# Patient Record
Sex: FEMALE | Race: WHITE | NOT HISPANIC OR LATINO | URBAN - METROPOLITAN AREA
[De-identification: names, ages, dates, MRNs, and addresses within clinical notes are randomized per-mention and may not be internally consistent; named-entity substitution may affect disease eponyms.]

---

## 2017-10-25 ENCOUNTER — OUTPATIENT (OUTPATIENT)
Dept: OUTPATIENT SERVICES | Facility: HOSPITAL | Age: 72
LOS: 1 days | Discharge: HOME | End: 2017-10-25

## 2017-10-25 DIAGNOSIS — Z12.31 ENCOUNTER FOR SCREENING MAMMOGRAM FOR MALIGNANT NEOPLASM OF BREAST: ICD-10-CM

## 2018-10-26 ENCOUNTER — OUTPATIENT (OUTPATIENT)
Dept: OUTPATIENT SERVICES | Facility: HOSPITAL | Age: 73
LOS: 1 days | Discharge: HOME | End: 2018-10-26

## 2018-10-26 DIAGNOSIS — Z12.31 ENCOUNTER FOR SCREENING MAMMOGRAM FOR MALIGNANT NEOPLASM OF BREAST: ICD-10-CM

## 2018-10-29 DIAGNOSIS — M81.0 AGE-RELATED OSTEOPOROSIS WITHOUT CURRENT PATHOLOGICAL FRACTURE: ICD-10-CM

## 2018-10-29 DIAGNOSIS — Z78.0 ASYMPTOMATIC MENOPAUSAL STATE: ICD-10-CM

## 2018-10-29 DIAGNOSIS — Z13.820 ENCOUNTER FOR SCREENING FOR OSTEOPOROSIS: ICD-10-CM

## 2019-10-23 PROBLEM — Z00.00 ENCOUNTER FOR PREVENTIVE HEALTH EXAMINATION: Status: ACTIVE | Noted: 2019-10-23

## 2019-10-24 ENCOUNTER — OTHER (OUTPATIENT)
Age: 74
End: 2019-10-24

## 2019-11-14 ENCOUNTER — OUTPATIENT (OUTPATIENT)
Dept: OUTPATIENT SERVICES | Facility: HOSPITAL | Age: 74
LOS: 1 days | Discharge: HOME | End: 2019-11-14
Payer: MEDICARE

## 2019-11-14 DIAGNOSIS — Z12.31 ENCOUNTER FOR SCREENING MAMMOGRAM FOR MALIGNANT NEOPLASM OF BREAST: ICD-10-CM

## 2019-11-14 PROCEDURE — 77067 SCR MAMMO BI INCL CAD: CPT | Mod: 26

## 2019-11-14 PROCEDURE — 77063 BREAST TOMOSYNTHESIS BI: CPT | Mod: 26

## 2019-11-18 ENCOUNTER — OTHER (OUTPATIENT)
Age: 74
End: 2019-11-18

## 2019-11-18 DIAGNOSIS — R92.8 OTHER ABNORMAL AND INCONCLUSIVE FINDINGS ON DIAGNOSTIC IMAGING OF BREAST: ICD-10-CM

## 2019-11-19 ENCOUNTER — OUTPATIENT (OUTPATIENT)
Dept: OUTPATIENT SERVICES | Facility: HOSPITAL | Age: 74
LOS: 1 days | Discharge: HOME | End: 2019-11-19
Payer: MEDICARE

## 2019-11-19 DIAGNOSIS — R92.8 OTHER ABNORMAL AND INCONCLUSIVE FINDINGS ON DIAGNOSTIC IMAGING OF BREAST: ICD-10-CM

## 2019-11-19 PROCEDURE — G0279: CPT | Mod: 26,LT

## 2019-11-19 PROCEDURE — 76641 ULTRASOUND BREAST COMPLETE: CPT | Mod: 26,LT

## 2019-11-19 PROCEDURE — 77065 DX MAMMO INCL CAD UNI: CPT | Mod: 26,LT

## 2019-11-20 ENCOUNTER — CLINICAL ADVICE (OUTPATIENT)
Age: 74
End: 2019-11-20

## 2019-11-26 ENCOUNTER — RESULT REVIEW (OUTPATIENT)
Age: 74
End: 2019-11-26

## 2019-11-26 ENCOUNTER — OUTPATIENT (OUTPATIENT)
Dept: OUTPATIENT SERVICES | Facility: HOSPITAL | Age: 74
LOS: 1 days | Discharge: HOME | End: 2019-11-26
Payer: MEDICARE

## 2019-11-26 PROCEDURE — 19082 BX BREAST ADD LESION STRTCTC: CPT | Mod: LT

## 2019-11-26 PROCEDURE — 19081 BX BREAST 1ST LESION STRTCTC: CPT | Mod: LT

## 2019-11-26 PROCEDURE — 88360 TUMOR IMMUNOHISTOCHEM/MANUAL: CPT | Mod: 26

## 2019-11-26 PROCEDURE — 88305 TISSUE EXAM BY PATHOLOGIST: CPT | Mod: 26

## 2019-11-26 PROCEDURE — 88342 IMHCHEM/IMCYTCHM 1ST ANTB: CPT | Mod: 26,59

## 2019-12-02 ENCOUNTER — RESULT REVIEW (OUTPATIENT)
Age: 74
End: 2019-12-02

## 2019-12-02 ENCOUNTER — APPOINTMENT (OUTPATIENT)
Dept: OBGYN | Facility: CLINIC | Age: 74
End: 2019-12-02

## 2019-12-03 ENCOUNTER — OTHER (OUTPATIENT)
Age: 74
End: 2019-12-03

## 2019-12-03 DIAGNOSIS — N60.22 FIBROADENOSIS OF LEFT BREAST: ICD-10-CM

## 2019-12-03 DIAGNOSIS — N60.12 DIFFUSE CYSTIC MASTOPATHY OF LEFT BREAST: ICD-10-CM

## 2019-12-03 DIAGNOSIS — D24.2 BENIGN NEOPLASM OF LEFT BREAST: ICD-10-CM

## 2019-12-12 ENCOUNTER — APPOINTMENT (OUTPATIENT)
Dept: BREAST CENTER | Facility: CLINIC | Age: 74
End: 2019-12-12
Payer: MEDICARE

## 2019-12-12 VITALS
WEIGHT: 155 LBS | DIASTOLIC BLOOD PRESSURE: 90 MMHG | SYSTOLIC BLOOD PRESSURE: 136 MMHG | HEIGHT: 65 IN | TEMPERATURE: 98.3 F | BODY MASS INDEX: 25.83 KG/M2

## 2019-12-12 DIAGNOSIS — Z86.79 PERSONAL HISTORY OF OTHER DISEASES OF THE CIRCULATORY SYSTEM: ICD-10-CM

## 2019-12-12 DIAGNOSIS — C50.312 MALIGNANT NEOPLASM OF LOWER-INNER QUADRANT OF LEFT FEMALE BREAST: ICD-10-CM

## 2019-12-12 DIAGNOSIS — Z80.3 FAMILY HISTORY OF MALIGNANT NEOPLASM OF BREAST: ICD-10-CM

## 2019-12-12 PROCEDURE — 99205 OFFICE O/P NEW HI 60 MIN: CPT

## 2019-12-12 RX ORDER — PSYLLIUM HUSK 0.4 G
CAPSULE ORAL
Refills: 0 | Status: ACTIVE | COMMUNITY

## 2019-12-12 RX ORDER — METOPROLOL TARTRATE 75 MG/1
TABLET, FILM COATED ORAL
Refills: 0 | Status: ACTIVE | COMMUNITY

## 2019-12-12 RX ORDER — ASPIRIN 81 MG
81 TABLET, DELAYED RELEASE (ENTERIC COATED) ORAL
Refills: 0 | Status: ACTIVE | COMMUNITY

## 2019-12-12 RX ORDER — ASCORBIC ACID 500 MG
TABLET ORAL
Refills: 0 | Status: ACTIVE | COMMUNITY

## 2019-12-12 RX ORDER — MULTIVITAMIN
TABLET ORAL
Refills: 0 | Status: ACTIVE | COMMUNITY

## 2019-12-12 RX ORDER — HYDROCHLOROTHIAZIDE 12.5 MG/1
TABLET ORAL
Refills: 0 | Status: ACTIVE | COMMUNITY

## 2019-12-19 NOTE — DATA REVIEWED
[FreeTextEntry1] : EXAM: MG MAMMO SCREEN W MATTEO BI# \par PROCEDURE DATE: 11/14/2019 \par INTERPRETATION: HISTORY: \par Bilateral MG MAMMO SCREEN W MATTEO BI# was performed. Patient is 74 years old \par and is seen for screening. The patient has no personal history of cancer. \par  The patient has the following family history of breast cancer: maternal \par aunt, at age 75, breast cancer. \par RISK ASSESSMENT: \par NCI Lifetime Risk: 3.7 \par Tyrer-Glennzick Lifetime Risk: 6.5 \par CLINICAL BREAST EXAM: \par The patient reports her last clinical breast exam was performed over one \par year ago. \par COMPARISON STUDIES: \par The present examination has been compared to prior imaging studies performed \par at [facility] on 09/19/2016, 10/25/2017 and \par 10/26/2018. \par MAMMOGRAM FINDINGS: \par Mammography was performed including the following views: bilateral \par craniocaudal with tomosynthesis, bilateral mediolateral oblique with \par tomosynthesis. The examination includes digital synthetic 2D and digital \par tomosynthesis 3D images. Additional imaging analysis was performed using CAD \par (computer-aided detection) software. \par The breasts are heterogeneously dense, which may obscure small masses. \par Finding 1: There is an irregular mass with associated calcifications and \par architectural distortion seen in the upper outer quadrant of the left breast. \par Finding 2: There are calcifications seen in the upper outer quadrant of the \par left breast. \par No suspicious mass, grouping of calcifications, or other abnormality is \par identified in the right breast. \par IMPRESSION: \par Finding 1: Mass in the left breast requires additional evaluation. \par RECOMMENDATION: \par Patient will be recalled for additional mammographic views and, if \par indicated, breast ultrasound. \par Finding 2: Calcifications in the left breast require additional evaluation. \par RECOMMENDATION: \par Patient will be recalled for additional mammographic views and, if \par indicated, breast ultrasound.\par ASSESSMENT: \par BI-RADS Category 0: Incomplete: Needs Additional Imaging Evaluation \par The patient will be notified of these results by telephone, and will also be \par mailed a written summary in layman's terms. \par ROSCOE BASSETT M.D., RESIDENT RADIOLOGIST \par This document has been electronically signed. \par RENITA HONG M.D., ATTENDING RADIOLOGIST \par This document has been electronically signed. Nov 15 2019 8:22AM \par \par \par \par EXAM: US BREAST COMPLETE LT \par EXAM: MG MAMMO DIAG W MATTEO LT# \par PROCEDURE DATE: 11/19/2019 \par INTERPRETATION: Clinical History / Reason for exam: Callback from screening \par mammogram. \par The patient reports her last clinical breast examination was performed over \par one year ago. \par Family history: Maternal aunt at the age of 75 \par Comparisons: Priors dating back to 2010. \par Views obtained:Full-field ML and spot compression and magnification views of \par the left breast. \par Computer-aided detection was utilized in the interpretation of this \par examination. \par Breast composition:The breasts are heterogeneously dense, which may obscure \par small masses. \par Findings: \par Mammogram: \par There is a persistent spiculated mass in the left breast upper outer \par quadrant with associated architectural distortion. Indeterminate grouped \par calcifications are noted in the upper outer quadrant posterior depth. \par Ultrasound: \par Unilateral left whole breast ultrasound was performed. \par At the 2:00 position 8 cm from the nipple, there is an irregular hypoechoic \par mass measuring 2.2 x 1.4 x 1.6 cm with associated adjacent architectural \par distortion. \par No additional solid or cystic masses identified. No left axillary adenopathy. \par Impression: Irregular left 2:00 position mass for which ultrasound-guided \par biopsy is recommended. Indeterminate left breast upper outer quadrant \par calcifications for which stereotactic guided biopsy is recommended. \par Recommendation: Stereotactic guided biopsy. Ultrasound-guided biopsy of the \par irregular left breast 2:00 position mass is also recommended. \par BI-RADS Category 4: Suspicious \par The above findings and recommendations were discussed with the patient at \par the time of the examination. \par ARANZA JOEL M.D., ATTENDING RADIOLOGIST \par This document has been electronically signed. Nov 19 2019 6:41PM \par \par \par \par \par \par EXAM: MG STEREO BX ADD LT SISC \par EXAM: MG STEREO BX 1ST LT SISC \par *** ADDENDUM 11/29/2019 *** \par Postprocedure patient follow-up and Pathology result: \par -Diagnosis: \par 1. BREAST, LEFT POSTERIOR CALCIFICATIONS, STEREOTACTIC GUIDED \par VACUUM ASSISTED NEEDLE CORE BIOPSIES: \par - HYALINIZED FIBROADENOMA CONTAINING STROMAL CALCIFICATIONS. \par - ADJACENT BENIGN ATROPHIC FATTY BREAST TISSUE WITH MILDLY \par PROLIFERATIVE TYPE FIBROCYSTIC CHANGES INCLUDING USUAL TYPE DUCT \par HYPERPLASIA, SCLEROSING ADENOSIS, AND FEW MICROCALCIFICATIONS. \par 2. BREAST, LEFT MASS, ULTRASOUND GUIDED NEEDLE CORE BIOPSIES: \par - INVASIVE MODERATELY DIFFERENTIATED MAMMARY CARCINOMA \par ASSOCIATED WITH CALCIFICATIONS AND BOTH FOCAL DUCTAL AND LOBULAR FEATURES \par - ATROPHIC FATTY BREAST TISSUE WITH PROLIFERATIVE TYPE \par FIBROCYSTIC CHANGES ASSOCIATED WITH MICROCALCIFICATIONS \par -The pathology results are concordant with the imaging findings. \par -Recommendation: Surgical/oncological consultation recommended. \par -No significant delayed complications.

## 2019-12-19 NOTE — ASSESSMENT
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 \par There are no other palpable masses, nipple discharge or inversion, skin changes of the breasts bilaterally.\par There is no axillary adenopathy appreciated.\par \par We had a lengthy discussion regarding her diagnosis and treatment options.\par She will be sent for a bilateral breast MRI with and without contrast for evaluation of extent of disease.\par If no other areas of disease are found, she will be a good candidate for breast conserving therapy with a left needle localized lumpectomy / left sentinel lymph node biopsy and possible axillary node dissection.\par She is also a potential candidate for partial breast irradiation as well. \par \par I spent a total of 60 minutes of face to face time with this patient, greater than 50% of which was spent in counseling and/or coordination of care.\par All of her questions were appropriately answered.\par She knows to call with any concerns.

## 2019-12-19 NOTE — REVIEW OF SYSTEMS
[As Noted in HPI] : as noted in HPI [Negative] : Endocrine [Breast Lump] : no breast lump [Breast Pain] : no breast pain [Nipple Discharge] : no nipple discharge [Nipple Inverted] : no inversion of the nipple

## 2019-12-19 NOTE — PHYSICAL EXAM
[Atraumatic] : atraumatic [Normocephalic] : normocephalic [No Supraclavicular Adenopathy] : no supraclavicular adenopathy [No Cervical Adenopathy] : no cervical adenopathy [No dominant masses] : no dominant masses in right breast  [Examined in the supine and seated position] : examined in the supine and seated position [No Nipple Discharge] : no left nipple discharge [No Swelling] : no swelling [No Axillary Lymphadenopathy] : no left axillary lymphadenopathy [No Ulceration] : no ulceration [No Rashes] : no rashes [Breast Nipple Inversion] : nipples not inverted [Breast Nipple Retraction] : nipples not retracted

## 2019-12-19 NOTE — HISTORY OF PRESENT ILLNESS
[FreeTextEntry1] : PCP: Dr. Cherise Waller\par GYN: Dr. Tyra Proctor\par \par Patient with Left breast invasive moderately differentiated ductal carcinoma with calcifications and both lobular and micropapillary features on ultrasound core biopsy 11/26/19; 2:00 N8, 22 mm (cork).  \par Estrogen receptor positive, 75\par Progesterone receptor positive, 5\par HER2 negative, 1+\par Ki-67: 10%\par \par Left breast hyalinized fibroadenoma containing stroma calcifications with adjacent benign atrophic fatty breast tissue on stereotactic core biopsy 11/26/19; LUOQ posterior calcifications (tophat).  \par \par Pt has had some left breast tenderness since biopsy; denies any other breast related complaints.\par \par (+) family history - breast cancer - maternal grandmother, maternal aunt\par (+) [employer] decent.

## 2019-12-19 NOTE — CONSULT LETTER
[Dear  ___] : Dear  [unfilled], [Consult Letter:] : I had the pleasure of evaluating your patient, [unfilled]. [Please see my note below.] : Please see my note below. [Consult Closing:] : Thank you very much for allowing me to participate in the care of this patient.  If you have any questions, please do not hesitate to contact me. [Sincerely,] : Sincerely, [FreeTextEntry2] : Perez Waller M.D.\par [address] [address]\par \par Tyra Proctor M.D.\par [address] [facility] [facility], [address]\[place] [address]  [FreeTextEntry3] : Estiven Azar M.D., F.A.C.S.  19

## 2020-01-10 ENCOUNTER — APPOINTMENT (OUTPATIENT)
Dept: BREAST CENTER | Facility: AMBULATORY SURGERY CENTER | Age: 75
End: 2020-01-10

## 2020-01-21 ENCOUNTER — APPOINTMENT (OUTPATIENT)
Dept: BREAST CENTER | Facility: CLINIC | Age: 75
End: 2020-01-21

## 2020-08-06 ENCOUNTER — APPOINTMENT (OUTPATIENT)
Dept: OBGYN | Facility: CLINIC | Age: 75
End: 2020-08-06
Payer: MEDICARE

## 2020-08-06 VITALS
HEART RATE: 78 BPM | DIASTOLIC BLOOD PRESSURE: 72 MMHG | SYSTOLIC BLOOD PRESSURE: 130 MMHG | HEIGHT: 65 IN | WEIGHT: 154 LBS | BODY MASS INDEX: 25.66 KG/M2 | TEMPERATURE: 97.9 F

## 2020-08-06 DIAGNOSIS — Z78.9 OTHER SPECIFIED HEALTH STATUS: ICD-10-CM

## 2020-08-06 DIAGNOSIS — Z87.39 PERSONAL HISTORY OF OTHER DISEASES OF THE MUSCULOSKELETAL SYSTEM AND CONNECTIVE TISSUE: ICD-10-CM

## 2020-08-06 DIAGNOSIS — Z63.4 DISAPPEARANCE AND DEATH OF FAMILY MEMBER: ICD-10-CM

## 2020-08-06 DIAGNOSIS — Z80.3 FAMILY HISTORY OF MALIGNANT NEOPLASM OF BREAST: ICD-10-CM

## 2020-08-06 DIAGNOSIS — Z78.0 ASYMPTOMATIC MENOPAUSAL STATE: ICD-10-CM

## 2020-08-06 PROCEDURE — G0101: CPT

## 2020-08-06 RX ORDER — ANASTROZOLE TABLETS 1 MG/1
1 TABLET ORAL
Refills: 0 | Status: ACTIVE | COMMUNITY

## 2020-08-06 SDOH — SOCIAL STABILITY - SOCIAL INSECURITY: DISSAPEARANCE AND DEATH OF FAMILY MEMBER: Z63.4

## 2020-08-06 NOTE — CHIEF COMPLAINT
[Annual Visit] : annual visit [FreeTextEntry1] : Patient is here for annual exam , up to date with PE ,  personal hx breast Ca  with Left breast lumpectomy done January 2020 under [facility]  breast specialist and oncologist  care on Anastrazole since February 2020, patient BRCA - negative , patient denies pelvic pain , vaginal bleeding.

## 2020-08-06 NOTE — HISTORY OF PRESENT ILLNESS
[Menarche Age: ____] : age at menarche was [unfilled] [Menopause Age: ____] : age at menopause was [unfilled] [NA] : N/A [___ Year(s) Ago] : [unfilled] year(s) ago [Postmenopausal] : is postmenopausal [Contraception] : does not use contraception [Sexually Active] : is not sexually active

## 2020-08-06 NOTE — PHYSICAL EXAM
[Alert] : alert [Awake] : awake [Acute Distress] : no acute distress [Mass] : no breast mass [Nipple Discharge] : no nipple discharge [Soft] : soft [Tender] : non tender [Axillary LAD] : no axillary lymphadenopathy [Vulvar Atrophy] : vulvar atrophy [Oriented x3] : oriented to person, place, and time [Labia Majora] : labia major [Labia Minora] : labia minora [Atrophy] : atrophy [Cystocele] : a cystocele [Normal] : clitoris [Uterine Adnexae] : were not tender and not enlarged [No Bleeding] : there was no active vaginal bleeding [Rectocele] : a rectocele [External Hemorrhoid] : an external hemorrhoid

## 2020-08-06 NOTE — DISCUSSION/SUMMARY
[FreeTextEntry1] : Patient for annual exam.\par No complaints.\par Under care of [facility] for left breast cancer. Patient is BRCA negative.\par \par Tyra Proctor M.D.\par

## 2020-10-27 ENCOUNTER — OUTPATIENT (OUTPATIENT)
Dept: OUTPATIENT SERVICES | Facility: HOSPITAL | Age: 75
LOS: 1 days | Discharge: HOME | End: 2020-10-27

## 2020-11-02 DIAGNOSIS — Z09 ENCOUNTER FOR FOLLOW-UP EXAMINATION AFTER COMPLETED TREATMENT FOR CONDITIONS OTHER THAN MALIGNANT NEOPLASM: ICD-10-CM

## 2020-11-02 DIAGNOSIS — Z78.0 ASYMPTOMATIC MENOPAUSAL STATE: ICD-10-CM

## 2020-11-02 DIAGNOSIS — M81.0 AGE-RELATED OSTEOPOROSIS WITHOUT CURRENT PATHOLOGICAL FRACTURE: ICD-10-CM

## 2021-07-16 ENCOUNTER — OUTPATIENT (OUTPATIENT)
Dept: OUTPATIENT SERVICES | Facility: HOSPITAL | Age: 76
LOS: 1 days | Discharge: HOME | End: 2021-07-16

## 2021-07-16 VITALS
HEART RATE: 71 BPM | RESPIRATION RATE: 18 BRPM | DIASTOLIC BLOOD PRESSURE: 59 MMHG | SYSTOLIC BLOOD PRESSURE: 119 MMHG | OXYGEN SATURATION: 98 %

## 2021-07-16 VITALS
TEMPERATURE: 97 F | OXYGEN SATURATION: 99 % | HEART RATE: 74 BPM | RESPIRATION RATE: 18 BRPM | WEIGHT: 154.98 LBS | HEIGHT: 65 IN | DIASTOLIC BLOOD PRESSURE: 73 MMHG | SYSTOLIC BLOOD PRESSURE: 158 MMHG

## 2021-07-16 DIAGNOSIS — Z98.890 OTHER SPECIFIED POSTPROCEDURAL STATES: Chronic | ICD-10-CM

## 2021-07-16 RX ORDER — METOPROLOL TARTRATE 50 MG
1 TABLET ORAL
Qty: 0 | Refills: 0 | DISCHARGE

## 2021-07-16 RX ORDER — MULTIVIT-MIN/FERROUS GLUCONATE 9 MG/15 ML
1 LIQUID (ML) ORAL
Qty: 0 | Refills: 0 | DISCHARGE

## 2021-07-16 RX ORDER — OMEGA-3 ACID ETHYL ESTERS 1 G
2 CAPSULE ORAL
Qty: 0 | Refills: 0 | DISCHARGE

## 2021-07-16 RX ORDER — ASPIRIN/CALCIUM CARB/MAGNESIUM 324 MG
1 TABLET ORAL
Qty: 0 | Refills: 0 | DISCHARGE

## 2021-07-16 NOTE — PACU DISCHARGE NOTE - COMMENTS
75 y o female S/P Left ECCE with IOL Implant, LSB/MAC without complications. VS /62 HR 66 RR 16 SaO2 100%. Pt tolerated procedure well.

## 2021-07-20 DIAGNOSIS — Z79.82 LONG TERM (CURRENT) USE OF ASPIRIN: ICD-10-CM

## 2021-07-20 DIAGNOSIS — H26.9 UNSPECIFIED CATARACT: ICD-10-CM

## 2021-10-18 PROBLEM — Z87.898 PERSONAL HISTORY OF OTHER SPECIFIED CONDITIONS: Chronic | Status: ACTIVE | Noted: 2021-07-16

## 2022-03-29 ENCOUNTER — APPOINTMENT (OUTPATIENT)
Dept: OBGYN | Facility: CLINIC | Age: 77
End: 2022-03-29
Payer: MEDICARE

## 2022-03-29 VITALS
DIASTOLIC BLOOD PRESSURE: 82 MMHG | WEIGHT: 155 LBS | TEMPERATURE: 97.7 F | HEART RATE: 79 BPM | HEIGHT: 64.5 IN | BODY MASS INDEX: 26.14 KG/M2 | SYSTOLIC BLOOD PRESSURE: 132 MMHG

## 2022-03-29 DIAGNOSIS — Z01.419 ENCOUNTER FOR GYNECOLOGICAL EXAMINATION (GENERAL) (ROUTINE) W/OUT ABNORMAL FINDINGS: ICD-10-CM

## 2022-03-29 PROCEDURE — 99397 PER PM REEVAL EST PAT 65+ YR: CPT

## 2022-03-29 RX ORDER — ALENDRONATE SODIUM 70 MG/1
70 TABLET ORAL
Refills: 0 | Status: ACTIVE | COMMUNITY

## 2022-03-29 NOTE — PHYSICAL EXAM
[Appropriately responsive] : appropriately responsive [Alert] : alert [No Acute Distress] : no acute distress [No Lymphadenopathy] : no lymphadenopathy [Soft] : soft [Non-tender] : non-tender [Non-distended] : non-distended [No HSM] : No HSM [No Lesions] : no lesions [No Mass] : no mass [Oriented x3] : oriented x3 [Labia Majora] : normal [Labia Minora] : normal [No Bleeding] : There was no active vaginal bleeding [Normal Position] : in a normal position [Uterine Adnexae] : normal [Examination Of The Breasts] : a normal appearance [Normal] : normal [No Discharge] : no discharge [No Masses] : no breast masses were palpable [FreeTextEntry6] : left breast  well healed lumpectomy scar  at 1 ocklock  [Atrophy] : atrophy

## 2022-03-29 NOTE — DISCUSSION/SUMMARY
[FreeTextEntry1] : 77 yo p4 for annual exam \par h/p left breast cancer/lumpectomy under care of  [facility] 3/2022\par  oosporosis\par  mammogram uptodate\par 12/2019 colonoscopy \par dexa 10/2020\par \par

## 2022-03-29 NOTE — HISTORY OF PRESENT ILLNESS
[postmenopausal] : postmenopausal [Y] : Positive pregnancy history [Menarche Age: ____] : age at menarche was [unfilled] [Menopause Age: ____] : age at menopause was [unfilled] [No] : Patient does not have concerns regarding sex [Previously active] : previously active [TextBox_4] : GYNHX\par No history of fibroids, cysts, or STDs\par no issues with pap in the past \par left breast cancer lumpectomy 1/2 20\par  BRCA negative \par  [Mammogramdate] :  [BoneDensityDate] : 2020 [ColonoscopyDate] : 2019 [PGHxTotal] : 4 [[facility]] : 44 [[facility] CenterxLiving] : 4 [FreeTextEntry1] :

## 2022-09-22 NOTE — ASU PREOP CHECKLIST - STERILIZATION AFFIRMATION
Medication:   Requested Prescriptions     Pending Prescriptions Disp Refills    lisinopril (PRINIVIL;ZESTRIL) 10 MG tablet 90 tablet 3     Sig: Take 1 tablet by mouth daily       Last Filled: 6/2/2022     Patient Phone Number: [phone] (home)     Last appt: 6/2/2022   Next appt: Visit date not found    Lab Results   Component Value Date     05/28/2022    K 3.7 05/28/2022     05/28/2022    CO2 25 05/28/2022    BUN 14 05/28/2022    CREATININE 0.9 05/28/2022    GLUCOSE 105 (H) 05/28/2022    CALCIUM 9.5 05/28/2022    PROT 7.5 05/28/2022    LABALBU 4.7 05/28/2022    BILITOT 0.8 05/28/2022    ALKPHOS 59 05/28/2022    AST 21 05/28/2022    ALT 31 05/28/2022    LABGLOM >60 05/28/2022    GFRAA >60 05/28/2022    AGRATIO 1.7 05/28/2022    GLOB 3.2 08/18/2017 n/a